# Patient Record
Sex: FEMALE | Race: WHITE | NOT HISPANIC OR LATINO
[De-identification: names, ages, dates, MRNs, and addresses within clinical notes are randomized per-mention and may not be internally consistent; named-entity substitution may affect disease eponyms.]

---

## 2017-11-28 ENCOUNTER — TRANSCRIPTION ENCOUNTER (OUTPATIENT)
Age: 67
End: 2017-11-28

## 2019-05-28 PROBLEM — Z00.00 ENCOUNTER FOR PREVENTIVE HEALTH EXAMINATION: Status: ACTIVE | Noted: 2019-05-28

## 2019-06-24 ENCOUNTER — APPOINTMENT (OUTPATIENT)
Dept: ORTHOPEDIC SURGERY | Facility: CLINIC | Age: 69
End: 2019-06-24
Payer: COMMERCIAL

## 2019-06-24 VITALS — HEIGHT: 62 IN | BODY MASS INDEX: 23 KG/M2 | WEIGHT: 125 LBS

## 2019-06-24 DIAGNOSIS — Z86.39 PERSONAL HISTORY OF OTHER ENDOCRINE, NUTRITIONAL AND METABOLIC DISEASE: ICD-10-CM

## 2019-06-24 DIAGNOSIS — Z86.69 PERSONAL HISTORY OF OTHER DISEASES OF THE NERVOUS SYSTEM AND SENSE ORGANS: ICD-10-CM

## 2019-06-24 DIAGNOSIS — Z85.3 PERSONAL HISTORY OF MALIGNANT NEOPLASM OF BREAST: ICD-10-CM

## 2019-06-24 DIAGNOSIS — Z78.9 OTHER SPECIFIED HEALTH STATUS: ICD-10-CM

## 2019-06-24 DIAGNOSIS — M25.549 PAIN IN JOINTS OF UNSPECIFIED HAND: ICD-10-CM

## 2019-06-24 PROCEDURE — 73030 X-RAY EXAM OF SHOULDER: CPT | Mod: LT

## 2019-06-24 PROCEDURE — 99204 OFFICE O/P NEW MOD 45 MIN: CPT

## 2019-06-24 RX ORDER — CETIRIZINE HCL 10 MG
TABLET ORAL
Refills: 0 | Status: ACTIVE | COMMUNITY

## 2019-06-24 RX ORDER — ESCITALOPRAM OXALATE 5 MG/1
TABLET, FILM COATED ORAL
Refills: 0 | Status: ACTIVE | COMMUNITY

## 2019-06-24 RX ORDER — ATORVASTATIN CALCIUM 80 MG/1
TABLET, FILM COATED ORAL
Refills: 0 | Status: ACTIVE | COMMUNITY

## 2019-06-24 NOTE — ASSESSMENT
[FreeTextEntry1] : 69-year-old woman about 6-7 months following her LEFT rotator cuff repair which was the second time she had this surgery in the LEFT shoulder. I explained to her that often healing and recovery he may take even up to a year after surgery in many patients will continue improving between 6 months and a year and therefore I think it's early to determine whether or not she will get better or not. I recommended going into a more formal physical therapy. She should do pain-free strengthening. Heat and ice and NSAIDs if needed. If it's not getting any better with the therapy in the next 5-6 weeks I would like for her to get a new MRI of the shoulder to evaluate the tendon for healing. I explained to her that these tendons do not always heal well because of poor vascularity.\par Followup in about 6 weeks

## 2019-06-24 NOTE — HISTORY OF PRESENT ILLNESS
[de-identified] : Ms. Browne is a 69-year-old left-hand-dominant woman who comes in with pain in her LEFT shoulder about 6-7 months following revision LEFT rotator cuff repair. She may also have had a biceps tenodesis. She does not have the operative report.She first had a LEFT rotator cuff repair in November 2014 and then had surgery again November 2018. Her shoulder had recovered in between the 2 surgeries. The surgeries were done by Dr. Gomez. She did physical therapy in January and February of this year for about 2 months. She was having ongoing pain and had a corticosteroid injection in April. She's still having pain in the lateral shoulder which can be 4/10 intermittently and dull. It's better when she takes medications such as meloxicam and worse with reaching and lifting particularly overhead.\par She also had a RIGHT rotator cuff repair surgery in 2016. She's had double mastectomy in 1997 and never got full elevation of her arms bilaterally after that surgery.

## 2019-06-24 NOTE — PHYSICAL EXAM
[UE] : 5/5 motor strength in bilateral upper extremities [Normal LUE] : Left Upper Extremity: No scars, rashes, lesions, ulcers, skin intact [Normal Touch] : sensation intact for touch [Normal RUE] : Right Upper Extremity: No scars, rashes, lesions, ulcers, skin intact [Normal] : Oriented to person, place, and time, insight and judgement were intact and the affect was normal [de-identified] : Bilateral shoulders\par Cervical spine is without tenderness and ROM is within normal limits and without pain.\par Normal appearance. \par AROM: 170 FE, IR to T 9, 180 abd.\par PROM: 170 FE, 70 ER at the side, 90 ER and 50 IR in the 90 degree abducted position. All about equal bilaterally\par Motor:  4/5  supraspinatus LEFT shoulder with pain,  5-/5 ER LEFT shoulder, 5/5 IR, 5/5 biceps, 5/5 deltoid.  Normal lift off test. 5/5 RIGHT shoulder throughout\par mild pain LEFT with Neer,  + Mackenzie. -  X-arm.   - [place]'s,  Speeds.\par Tender over the biceps tendon and the anterior shoulder.  \par Laxity: normal.\par No scapular winging.\par Sensation is intact distally in the UE.\par Skin is intact in the UE. \par Intact Motor distally.\par  [de-identified] : Well-healed shoulder incisions [de-identified] : No respiratory distress [de-identified] : \par X-rays LEFT shoulder 3 views today show slight elevation of the humeral head on the AP view. There are some osteopenic areas around the greater tuberosity possibly related to anchor placement. No narrowing glenohumeral joint. Possible distal clavicle resection

## 2019-08-05 ENCOUNTER — APPOINTMENT (OUTPATIENT)
Dept: ORTHOPEDIC SURGERY | Facility: CLINIC | Age: 69
End: 2019-08-05
Payer: COMMERCIAL

## 2019-08-05 DIAGNOSIS — M17.11 UNILATERAL PRIMARY OSTEOARTHRITIS, RIGHT KNEE: ICD-10-CM

## 2019-08-05 DIAGNOSIS — S80.01XA CONTUSION OF RIGHT KNEE, INITIAL ENCOUNTER: ICD-10-CM

## 2019-08-05 PROCEDURE — 73564 X-RAY EXAM KNEE 4 OR MORE: CPT | Mod: RT

## 2019-08-05 PROCEDURE — 99214 OFFICE O/P EST MOD 30 MIN: CPT
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
## 2019-08-05 NOTE — PHYSICAL EXAM
[Normal RLE] : Right Lower Extremity: No scars, rashes, lesions, ulcers, skin intact [Normal Touch] : sensation intact for touch [Normal LLE] : Left Lower Extremity: No scars, rashes, lesions, ulcers, skin intact [Normal] : No swelling, no edema, normal pedal pulses and normal temperature [LE] : Sensory: Intact in bilateral lower extremities [DP] : dorsalis pedis 2+ and symmetric bilaterally [PT] : posterior tibial 2+ and symmetric bilaterally [de-identified] : RIGHT  shoulder:\par Cervical spine is without tenderness and ROM is within normal limits and without pain.\par Normal appearance. \par AROM: 175 FE, IR to T 9, 180 abd.\par PROM: 175 FE, 60 ER at the side, 90 ER and 40 IR in the 90 degree abducted position.\par Motor:  5-/5  supraspinatus,  5/5 ER, 5/5 IR, 5/5 biceps, 5/5 deltoid.  Normal lift off test\par mild discomfort with Neer and Mackenzie. -  X-arm.   - [place]'s, + Speeds.\par no significant tenderness.  \par Laxity: normal.\par No scapular winging.\par Sensation is intact distally in the UE.\par Skin is intact in the UE. \par Intact Motor distally.\par \par RIGHT Knee:\par Nonantalgic gait. Minimal knee pain walking\par Small effusion.\par - erythema, edema, warmth. No ecchymoses\par Tender medial tibial plateau.\par ROM: 0 degrees extension to 120 degrees flexion RIGHT versus 130 LEFT. ++ Patellofemoral  crepitus RIGHT more than LEFT knee\par - Dariana.\par 1A Lachman.  - Pivot shift. - posterior drawer. Normal rotational, varus/valgus laxity.\par Intact extensor mechanism.\par NVI distally.\par No abrasions [de-identified] : \par Reviewed the MRI from July 22, 2019 which does show tearing certainly of the articular fibers. It looks like some of the bursal sided fibers may be intact and it may not be completely torn.Postsurgical changes are present.\par \par X-rays of the RIGHT knee weightbearing for the use today show no fractures. Osteophytes of the patella. Ossicle near the posterior tib-fib joint.
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
## 2019-08-06 ENCOUNTER — RX RENEWAL (OUTPATIENT)
Age: 69
End: 2019-08-06

## 2019-12-03 PROBLEM — M25.512 LEFT SHOULDER PAIN: Status: ACTIVE | Noted: 2019-06-24

## 2019-12-04 ENCOUNTER — APPOINTMENT (OUTPATIENT)
Dept: ORTHOPEDIC SURGERY | Facility: CLINIC | Age: 69
End: 2019-12-04
Payer: COMMERCIAL

## 2019-12-04 DIAGNOSIS — M25.512 PAIN IN LEFT SHOULDER: ICD-10-CM

## 2019-12-04 PROCEDURE — 99213 OFFICE O/P EST LOW 20 MIN: CPT | Mod: 25

## 2019-12-04 PROCEDURE — 20610 DRAIN/INJ JOINT/BURSA W/O US: CPT | Mod: LT

## 2019-12-04 NOTE — PHYSICAL EXAM
[de-identified] : LEFT  shoulder:\par Cervical spine is without tenderness and ROM is within normal limits and without pain.\par Normal appearance. There may be mild hypertrophy posterior shoulder LEFT compared to RIGHT\par AROM: 180 FE, IR to T 9 , 180 abd.\par PROM: 180 FE, 70 ER at the side, 90 ER and 65 IR in the 90 degree abducted position.\par Motor:  4+/5  supraspinatus,  5-/5 ER, 5/5 IR, 5/5 biceps, 5/5 deltoid.  Normal lift off test\par - Neer, - Mackenzie. + Pain Posterior shoulder with X-arm.   - [place]'s, - Speeds.\par Nontender a.c. joint. Nontender over the biceps tendon and the anterior shoulder.  \par Laxity: normal.\par No scapular winging.\par Sensation is intact distally in the UE.\par Skin is intact in the UE. \par Intact Motor distally.\par

## 2019-12-04 NOTE — HISTORY OF PRESENT ILLNESS
[de-identified] : Ms. Browne comes in for followup for her LEFT shoulder where she has had prior rotator cuff repair and revision repair that was performed about a year ago. She was having ongoing pain. MRI in July of this year showed a tear of the Articular fibers supraspinatus and infraspinatus tendons Retracted about 3 cm and the bursal fibers retracted about 1.4 cm in the supraspinatus. Looking at the films I do feel that there is some of the repair which is intact.\par She has done more physical therapy. Her shoulder is feeling worse. She has more lateral shoulder pain.\par Does do the home exercises. Pain is in the lateral shoulder with certain movements greatest when she reaches across her body. Often she doesn't have pain when she is just at rest but she will get a rather consistent pain with movements which is frustrating. Her shoulder is not stiff in the strength is okay with mild weakness.\par She is taking a muscle relaxer and meloxicam and gabapentin at night.

## 2019-12-04 NOTE — DISCUSSION/SUMMARY
[de-identified] : 69-year-old woman who has had prior rotator cuff repair and  a revision 1 yr ago with ongoing shoulder pain.\par She has done extensive therapy. She has good motion and strength is okay but there is still mild weakness. Based on the MRI it looks like the humeral head is riding up and there is an undersurface or delaminated tear in the rotator cuff but it looks like some of the superior fibers are attached just lateral to the articular surface.\par Since she hasn't improved we decided to try a corticosteroid injection to see if this alleviates the pain. Hopefully allows her to rehabilitate better. She may try acupuncture. She will take a break from the therapy But continue with exercises on her own.\par If it's not getting better she'll get a diagnostic ultrasound.\par Followup 8 wks

## 2019-12-04 NOTE — PROCEDURE
[Injection] : Injection [Left] : of the left [Subacromial Bursa] : subacromial bursa [Inflammation] : inflammation [Bleeding] : bleeding [Infection] : infection [Patient] : patient [Allergic Reaction] : allergic reaction [Risk] : risk [Verbal Consent Obtained] : verbal consent was obtained prior to the procedure [Alternatives] : alternatives [Benefits] : benefits [Betadine] : betadine [Alcohol] : alcohol [Posterior] : posterior [1% Lidocaine___(mL)] : [unfilled] mL of 1% Lidocaine [22] : a 22-gauge [Bandage Applied] : a bandage [Tolerated Well] : The patient tolerated the procedure well [Methylpred. 40mg/mL___(mL)] : [unfilled] mL 40mg/mL methylprednisolone [None] : none [No Strenuous Activity___day(s)] : avoid strenuous activity for [unfilled] day(s) [PRN] : PRN

## 2020-05-27 ENCOUNTER — APPOINTMENT (OUTPATIENT)
Dept: ORTHOPEDIC SURGERY | Facility: CLINIC | Age: 70
End: 2020-05-27
Payer: COMMERCIAL

## 2020-05-27 VITALS — BODY MASS INDEX: 23 KG/M2 | WEIGHT: 125 LBS | HEIGHT: 62 IN

## 2020-05-27 PROCEDURE — 99214 OFFICE O/P EST MOD 30 MIN: CPT

## 2020-05-27 PROCEDURE — 73030 X-RAY EXAM OF SHOULDER: CPT | Mod: LT

## 2020-05-27 NOTE — ASSESSMENT
[FreeTextEntry1] : 70-year-old woman who had revision LEFT shoulder Rotator cuff repair one and a half years ago. The first rotator cuff repair was in November 2014.\par She has at least partial tearing or significant attenuation of the supraspinatus repair and has chronic weakness although she can Elevate her arms well and has very good motion. There is no evidence of arthritis developing at this time.\par I suggested she go back to physical therapy to see if this helps the pain and strength for the time being. At some point surgery may need to be considered which would be either a revision repair depending on the quality of the tissue possibly with allograft versus a superior capsular reconstruction.\par she may try some acupuncture. I suggested taking either meloxicam or Aleve but shouldn't take both medications. She can take 1-2 Aleve up to twice a day with meals but watch her stomach. Otherwise take Tylenol. Heat and ice as needed.\par His pain is persisting we may get another ultrasound to check the Tendon for tearing or at some point to consider surgical treatment. The MRI and ultrasounds have been difficult to interpret given the surgical changes and wasn't clear if there was a complete tear or partial tear or just attenuation.different studies gait different impressions.\par She has spent another corticosteroid injection but I would try to avoid more corticosteroids which is weak in the rotator cuff and may make future surgery less successful.Follow up in 6-8 weeks.

## 2020-05-27 NOTE — PHYSICAL EXAM
[UE] : Sensory: Intact in bilateral upper extremities [Normal RUE] : Right Upper Extremity: No scars, rashes, lesions, ulcers, skin intact [Normal Touch] : sensation intact for touch [Normal LUE] : Left Upper Extremity: No scars, rashes, lesions, ulcers, skin intact [Normal] : Oriented to person, place, and time, insight and judgement were intact and the affect was normal [de-identified] : LEFT  shoulder/ rate for comparison\par Cervical spine is without tenderness and ROM is within normal limits and without pain.\par Normal appearance. \par AROM: 175 FE, IR to T 9 , 180 abd. Equal bilateral but pain with extremes of motion on the LEFT but not the RIGHT\par PROM: 175 FE, 70 ER at the side, 90 ER and 65 IR in the 90 degree abducted position. Pain LEFT shoulder at extremes of motion but not the RIGHT\par Motor:  4-/5  supraspinatus LEFT versus 5/5 RIGHT,  5-/5 ER, 5/5 IR, 5/5 biceps, 5/5 deltoid.  Normal lift off test\par + Neer, + Mackenzie. + Pain Posterior shoulder with X-arm.   - [place]'s, + Speeds  lEFT.\par Nontender a.c. joint. Nontender over the biceps tendon and the anterior shoulder.  \par Laxity: normal.\par No scapular winging.\par Sensation is intact distally in the UE.\par Skin is intact in the UE. \par Intact Motor distally.\par  [de-identified] : No respiratory distress or coughing [de-identified] : \par X-rays LEFT shoulder 3 views taken today are without any change compared to x-rays almost one year ago in June 2019. There appears to be mild elevation of the humeral head but no other degenerative changes are seen.\par There are surgical clips in the axilla consistent with her prior breast cancer surgery with axillary dissection.

## 2020-05-27 NOTE — HISTORY OF PRESENT ILLNESS
[de-identified] : Ms. Browne comes in for followup for her LEFT shoulder where she has had prior rotator cuff repair and revision repair that was performed about 1 1/2 yrs ago by Dr. Gomez. She had a steroid injection in Dec 2019 which helped a lot and she could do everything without pain.\par \par Because of Covid she has been cleaning and cooking more for herself in the past several months and she thinks this aggravated her shoulder. \par Pain is variable from 0-7/10 mostly in the lateral shoulder. It hurts at night and sometimes in the morning or with movements.\par She takes meloxicam usually at night but she doesn't know the dose. Sometimes she'll take an Aleve in the day as well.\par She does her shoulder exercises regularly.

## 2020-07-14 PROBLEM — M75.102 ROTATOR CUFF TEAR, NON-TRAUMATIC, LEFT: Status: ACTIVE | Noted: 2019-06-24

## 2020-07-14 PROBLEM — M75.42 SHOULDER IMPINGEMENT, LEFT: Status: ACTIVE | Noted: 2019-06-24

## 2020-07-14 PROBLEM — Z98.890 S/P ROTATOR CUFF REPAIR: Status: ACTIVE | Noted: 2019-06-24

## 2020-07-15 ENCOUNTER — APPOINTMENT (OUTPATIENT)
Dept: ORTHOPEDIC SURGERY | Facility: CLINIC | Age: 70
End: 2020-07-15
Payer: MEDICARE

## 2020-07-15 DIAGNOSIS — Z98.890 OTHER SPECIFIED POSTPROCEDURAL STATES: ICD-10-CM

## 2020-07-15 DIAGNOSIS — M75.102 UNSPECIFIED ROTATOR CUFF TEAR OR RUPTURE OF LEFT SHOULDER, NOT SPECIFIED AS TRAUMATIC: ICD-10-CM

## 2020-07-15 DIAGNOSIS — M75.42 IMPINGEMENT SYNDROME OF LEFT SHOULDER: ICD-10-CM

## 2020-07-15 DIAGNOSIS — M77.02 MEDIAL EPICONDYLITIS, LEFT ELBOW: ICD-10-CM

## 2020-07-15 PROCEDURE — 99214 OFFICE O/P EST MOD 30 MIN: CPT

## 2020-07-15 NOTE — PHYSICAL EXAM
[UE] : Sensory: Intact in bilateral upper extremities [Normal RUE] : Right Upper Extremity: No scars, rashes, lesions, ulcers, skin intact [Normal Touch] : sensation intact for touch [Normal LUE] : Left Upper Extremity: No scars, rashes, lesions, ulcers, skin intact [Normal] : Alert and in no acute distress [Rad] : radial 2+ and symmetric bilaterally [de-identified] : No respiratory distress or coughing [de-identified] : LEFT  shoulder/ rate for comparison\par Cervical spine is without tenderness and ROM is within normal limits and without pain.\par Normal appearance. \par AROM: 175 FE, IR to T 9 , 180 abd. no significant pain\par PROM: 175 FE, 70 ER at the side, 90 ER and 65 IR in the 90 degree abducted position\par Motor:  5-/5  supraspinatus LEFT versus 5/5 RIGHT,  5/5 ER, 5/5 IR, 5/5 biceps, 5/5 deltoid.  Normal lift off test\par Slight discomfort with Neer, Mackenzie.\par Nontender a.c. joint. Nontender over the biceps tendon and the anterior shoulder.  \par Laxity: normal.\par No scapular winging.\par Sensation is intact distally in the UE.\par Skin is intact in the UE. \par Intact Motor distally.\par \par LEFT elbow\par Mildly tender medial epicondyles. Negative Tinel's cubital tunnel. Nontender lateral epicondyles.\par Full elbow range of motion 0-150° and full pronation and supination without pain.\par Motor and sensation are intact distally

## 2020-07-15 NOTE — ASSESSMENT
[FreeTextEntry1] : 70-year-old woman who had revision LEFT shoulder Rotator cuff repair 2018 and primary rotator cuff repair in November 2014.\par She has at least partial tearing or significant attenuation of the supraspinatus repair. She had been having ongoing pain but currently is doing quite well with good motion and good strength. Pain is much better than it had been Given the improvement She she will finish up the physical therapy and do the exercises on her own. He can and ice as needed. Aleve as needed. She also seems to be getting some medial epicondylitis. She should watch her  when she is playing golf. Stretch the forearm. Heat and ice. Full-time shell. If it's getting more symptomatic she should come in for further treatment and may consider physical therapy for this as well.

## 2020-07-15 NOTE — HISTORY OF PRESENT ILLNESS
[de-identified] : Ms. Browne comes in for followup for her LEFT shoulder where she has had prior rotator cuff repair and revision repair that was performed about 1 1/2 yrs ago by Dr. Gomez. She had a steroid injection in Dec 2019 which helped a lot. .\par She has been going for PT since I last saw her.\par Her pain is much better. It had gotten aggravated during corona virus because she was doing a lot of housecleaning but since she stopped doing that it improved. She has good motion and good strength. She has a few sessions LEFT and physical therapy\par Sometimes she'll take an Aleve  QOD but not for the shoulder. Occasionally she takes a muscle relaxant. She ices. She is playing golf. \par She does her shoulder exercises regularly. She feels like PT helped. \par She has some LEFT medial elbow pain recently. It's very mild.

## 2020-07-31 ENCOUNTER — RESULT REVIEW (OUTPATIENT)
Age: 70
End: 2020-07-31

## 2020-11-26 ENCOUNTER — EMERGENCY (EMERGENCY)
Facility: HOSPITAL | Age: 70
LOS: 1 days | Discharge: HOME | End: 2020-11-26
Attending: EMERGENCY MEDICINE | Admitting: EMERGENCY MEDICINE
Payer: MEDICARE

## 2020-11-26 VITALS
TEMPERATURE: 98 F | HEART RATE: 77 BPM | OXYGEN SATURATION: 95 % | HEIGHT: 62 IN | DIASTOLIC BLOOD PRESSURE: 78 MMHG | SYSTOLIC BLOOD PRESSURE: 121 MMHG | WEIGHT: 115.08 LBS | RESPIRATION RATE: 16 BRPM

## 2020-11-26 PROCEDURE — 12001 RPR S/N/AX/GEN/TRNK 2.5CM/<: CPT

## 2020-11-26 PROCEDURE — 99283 EMERGENCY DEPT VISIT LOW MDM: CPT | Mod: 25

## 2020-11-26 PROCEDURE — 99282 EMERGENCY DEPT VISIT SF MDM: CPT | Mod: 25

## 2020-11-26 NOTE — ED PROVIDER NOTE - PHYSICAL EXAMINATION
CONSTITUTIONAL:  Generally well appearing, no acute distress, alert, awake and oriented  HEENT:  Moist mucous membranes, normal voice  PULM:  No accessory muscle use, speaking full sentences  SKIN:  2.5 cm linear oblique laceration to ventral surface of right middle phalanx; hemostasis achieved.  NEURO:  Sensation intact throughout affected right hand, normal flexion/extension of fingers.  Cap refill <3 seconds throughout right hand.

## 2020-11-26 NOTE — ED PROVIDER NOTE - NS ED ROS FT
CONSTITUTIONAL:  No fever or chills, no bodyaches  HEENT:  No sore throat or headache, no nasal congestion  PULM:  No cough or shortness of breath  GI:  No diarrhea, no vomiting   SKIN:  + Laceration to right middle finger.  NEURO:  No numbness/tingling, normal movement to affected right hand/finger.

## 2020-11-26 NOTE — ED PROVIDER NOTE - CLINICAL SUMMARY MEDICAL DECISION MAKING FREE TEXT BOX
Patient in ED w concern for laceration of right middle finger, sustained from piece of broken glass.  Wound explored under bloodless field, no sign of FB.  Patient notes cut was sustained from single large piece of glass, no concern for FB.  Neurovasc status intact to affected right hand.  Tetanus UTD.  Wound is cleansed, irrigated, explored.  She is educated re wound care and follow up in apx 7 days for suture removal.  Patient is advised to follow up with their PCP in 1-2 days without fail for wound check.  Patient instructed to return to ED immediately should their symptoms worsen or if there is any concern prior to the recommended PCP follow up.  Patient is aware of plan and verbalizes their understanding.  Will discharge home at this time.

## 2020-11-26 NOTE — ED PROVIDER NOTE - OBJECTIVE STATEMENT
70 year old female presents to ED with concern for laceration to right middle finger sustained by broken piece of glass today while cleaning up.  Patient states she had a glass bowl that broke in half, denies multiple pieces of glass or concern for foreign body.  She presents to ED with oblique linear laceration to right middle finger along ventral surface, bandage in place.  Neurovascular status is intact to affected right upper extremity on arrival to ED.  Patient denies use of blood thinning medications.  Tetanus is up to date.

## 2020-11-26 NOTE — ED PROVIDER NOTE - NSFOLLOWUPINSTRUCTIONS_ED_ALL_ED_FT
Please follow up with your primary physician in 7 days for days for re evaluation and wound check.  Please return to ER immediately should your symptoms worsen or if you have any concern prior to this recommended follow up.          Finger Laceration    WHAT YOU NEED TO KNOW:    A finger laceration is a deep cut in your skin. Your blood vessels, bones, joints, tendons, or nerves may also be injured.    DISCHARGE INSTRUCTIONS:    Return to the emergency department if:   •Your wound comes apart.      •Blood soaks through your bandage.      •You have severe pain in your finger or hand.      •Your finger is pale and cold.      •You have sudden trouble moving your finger.      •Your swelling suddenly gets worse.      •You have red streaks on your skin coming from your wound.      Call your doctor or hand specialist if:   •You have new numbness or tingling.      •Your finger feels warm, looks swollen or red, and is draining pus.      •You have a fever.      •You have questions or concerns about your condition or care.      Medicines: You may need any of the following:   •Antibiotics help prevent a bacterial infection.       •Acetaminophen decreases pain and fever. It is available without a doctor's order. Ask how much to take and how often to take it. Follow directions. Read the labels of all other medicines you are using to see if they also contain acetaminophen, or ask your doctor or pharmacist. Acetaminophen can cause liver damage if not taken correctly. Do not use more than 4 grams (4,000 milligrams) total of acetaminophen in one day.       •Prescription pain medicine may be given. Ask your healthcare provider how to take this medicine safely. Some prescription pain medicines contain acetaminophen. Do not take other medicines that contain acetaminophen without talking to your healthcare provider. Too much acetaminophen may cause liver damage. Prescription pain medicine may cause constipation. Ask your healthcare provider how to prevent or treat constipation.       •Take your medicine as directed. Contact your healthcare provider if you think your medicine is not helping or if you have side effects. Tell him or her if you are allergic to any medicine. Keep a list of the medicines, vitamins, and herbs you take. Include the amounts, and when and why you take them. Bring the list or the pill bottles to follow-up visits. Carry your medicine list with you in case of an emergency.      Self-care:   •Apply ice on your finger for 15 to 20 minutes every hour or as directed. Use an ice pack, or put crushed ice in a plastic bag. Cover it with a towel before you apply it to your skin. Ice helps prevent tissue damage and decreases swelling and pain.      •Elevate your hand above the level of your heart as often as you can. This will help decrease swelling and pain. Prop your hand on pillows or blankets to keep it elevated comfortably.      •Wear your splint as directed. A splint will decrease movement and stress on your wound. The splint may help your wound heal faster. Ask your healthcare provider how to apply and remove a splint.      •Apply ointments to decrease scarring. Do not apply ointments until your healthcare provider says it is okay. You may need to wait until your wound is healed. Ask which ointment to buy and how often to use it.      Wound care:   •Do not get your wound wet until your healthcare provider says it is okay. Do not soak your hand in water. Do not go swimming until your healthcare provider says it is okay. When your healthcare provider says it is okay, carefully wash around the wound with soap and water. Let soap and water run over your wound. Gently pat the area dry or allow it to air dry.      •Change your bandages when they get wet, dirty, or after washing. Apply new, clean bandages as directed. Do not apply elastic bandages or tape too tightly. Do not put powders or lotions on your wound.      •Apply antibiotic ointment as directed. Your healthcare provider may give you antibiotic ointment to put over your wound if you have stitches. If you have Strips-Strips™ over your wound, let them dry up and fall off on their own. If they do not fall off within 14 days, gently remove them. If you have glue over your wound, do not remove or pick at it. If your glue comes off, do not replace it with glue that you have at home.      •Check your wound every day for signs of infection. Signs of infection include swelling, redness, or pus.      Follow up with your doctor or hand specialist in 2 days: Write down your questions so you remember to ask them during your visits.       © Copyright [employer] 2020           back to top                          © Copyright [employer] 2020

## 2020-11-26 NOTE — ED PROVIDER NOTE - PATIENT PORTAL LINK FT
You can access the FollowMyHealth Patient Portal offered by [facility] by registering at the following website: [email or web address]. By joining [facility] FollowMyHealth portal, you will also be able to view your health information using other applications (apps) compatible with our system.

## 2020-11-30 DIAGNOSIS — S61.212A LACERATION WITHOUT FOREIGN BODY OF RIGHT MIDDLE FINGER WITHOUT DAMAGE TO NAIL, INITIAL ENCOUNTER: ICD-10-CM
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
## 2020-11-30 DIAGNOSIS — Z88.8 ALLERGY STATUS TO OTHER DRUGS, MEDICAMENTS AND BIOLOGICAL SUBSTANCES: ICD-10-CM
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
## 2023-02-28 NOTE — REASON FOR VISIT
[Follow-Up Visit] : a follow-up visit for [Shoulder Pain] : shoulder pain Abbe Flap (Lower To Upper Lip) Text: The defect of the upper lip was assessed and measured.  Given the location and size of the defect, an Abbe flap was deemed most appropriate.  Using a sterile surgical marker, an appropriate Abbe flap was measured and drawn on the lower lip. Local anesthesia was then infiltrated. A scalpel was then used to incise the upper lip through and through the skin, vermilion, muscle and mucosa, leaving the flap pedicled on the opposite side.  The flap was then rotated and transferred to the lower lip defect.  The flap was then sutured into place with a three layer technique, closing the orbicularis oris muscle layer with subcutaneous buried sutures, followed by a mucosal layer and an epidermal layer.

## 2023-05-09 ENCOUNTER — APPOINTMENT (OUTPATIENT)
Dept: OBGYN | Facility: CLINIC | Age: 73
End: 2023-05-09
Payer: MEDICARE

## 2023-05-09 VITALS
DIASTOLIC BLOOD PRESSURE: 78 MMHG | WEIGHT: 112 LBS | OXYGEN SATURATION: 98 % | HEART RATE: 70 BPM | SYSTOLIC BLOOD PRESSURE: 133 MMHG | BODY MASS INDEX: 20.49 KG/M2

## 2023-05-09 DIAGNOSIS — N95.1 MENOPAUSAL AND FEMALE CLIMACTERIC STATES: ICD-10-CM

## 2023-05-09 DIAGNOSIS — R39.9 UNSPECIFIED SYMPTOMS AND SIGNS INVOLVING THE GENITOURINARY SYSTEM: ICD-10-CM

## 2023-05-09 DIAGNOSIS — N94.818 OTHER VULVODYNIA: ICD-10-CM

## 2023-05-09 PROCEDURE — 99203 OFFICE O/P NEW LOW 30 MIN: CPT

## 2023-05-09 RX ORDER — CIPROFLOXACIN HYDROCHLORIDE 250 MG/1
250 TABLET, FILM COATED ORAL
Qty: 6 | Refills: 1 | Status: ACTIVE | COMMUNITY
Start: 2023-05-09 | End: 1900-01-01

## 2023-05-12 DIAGNOSIS — B96.89 ACUTE VAGINITIS: ICD-10-CM

## 2023-05-12 DIAGNOSIS — N76.0 ACUTE VAGINITIS: ICD-10-CM

## 2023-05-12 LAB
BACTERIA UR CULT: NORMAL
CANDIDA VAG CYTO: NOT DETECTED
G VAGINALIS+PREV SP MTYP VAG QL MICRO: DETECTED
T VAGINALIS VAG QL WET PREP: NOT DETECTED

## 2023-05-12 RX ORDER — METRONIDAZOLE 7.5 MG/G
0.75 GEL VAGINAL
Qty: 1 | Refills: 4 | Status: ACTIVE | COMMUNITY
Start: 2023-05-12 | End: 1900-01-01

## 2023-05-12 NOTE — HISTORY OF PRESENT ILLNESS
[Previously active] : previously active [Men] : men [Yes] : pregnancy [Patient reported PAP Smear was normal] : Patient reported PAP Smear was normal [Patient reported colonoscopy was normal] : Patient reported colonoscopy was normal [FreeTextEntry1] : Gertrude Browne presents for UTI. She states she has frequent burning and itch when she goes to the bathroom, no discharge. [TextBox_19] : s/p bilateral mastectomy [PapSmeardate] : 10/2022 [TextBox_43] : *thinks due this yr [TextBox_38] : dana, 1 miscarriage [FreeTextEntry2] :  has issues

## 2023-05-12 NOTE — PHYSICAL EXAM
[Appropriately responsive] : appropriately responsive [No Lymphadenopathy] : no lymphadenopathy [Regular Rate Rhythm] : regular rate rhythm [Soft] : soft [Non-tender] : non-tender [Non-distended] : non-distended [No Mass] : no mass [Oriented x3] : oriented x3 [Labia Majora] : normal [Labia Minora] : normal [Atrophy] : atrophy [Normal] : normal

## 2023-05-12 NOTE — PLAN
[FreeTextEntry1] : new pt problem visit:\par uti symptoms\par last had 6 months\par *burning with voiding\par vaginitis swab also sent\par will reach out with results\par \par *having shoulder surgery soon at [facility]

## 2023-05-23 ENCOUNTER — APPOINTMENT (OUTPATIENT)
Dept: OBGYN | Facility: CLINIC | Age: 73
End: 2023-05-23

## 2023-05-26 ENCOUNTER — APPOINTMENT (OUTPATIENT)
Dept: OBGYN | Facility: CLINIC | Age: 73
End: 2023-05-26
Payer: MEDICARE

## 2023-05-26 VITALS
DIASTOLIC BLOOD PRESSURE: 78 MMHG | BODY MASS INDEX: 20.43 KG/M2 | SYSTOLIC BLOOD PRESSURE: 133 MMHG | HEIGHT: 62 IN | WEIGHT: 111 LBS | OXYGEN SATURATION: 99 % | HEART RATE: 65 BPM

## 2023-05-26 DIAGNOSIS — N76.0 ACUTE VAGINITIS: ICD-10-CM

## 2023-05-26 DIAGNOSIS — B96.89 ACUTE VAGINITIS: ICD-10-CM

## 2023-05-26 DIAGNOSIS — N76.2 ACUTE VULVITIS: ICD-10-CM

## 2023-05-26 PROCEDURE — 99213 OFFICE O/P EST LOW 20 MIN: CPT

## 2023-05-26 RX ORDER — NYSTATIN AND TRIAMCINOLONE ACETONIDE 100000; 1 MG/G; MG/G
100000-0.1 CREAM TOPICAL TWICE DAILY
Qty: 1 | Refills: 3 | Status: ACTIVE | COMMUNITY
Start: 2023-05-26 | End: 1900-01-01

## 2023-05-26 RX ORDER — ESTRADIOL 10 UG/1
10 TABLET VAGINAL
Qty: 25 | Refills: 3 | Status: ACTIVE | COMMUNITY
Start: 2023-05-09 | End: 1900-01-01

## 2023-05-26 RX ORDER — METRONIDAZOLE 500 MG/1
500 TABLET ORAL TWICE DAILY
Qty: 14 | Refills: 3 | Status: ACTIVE | COMMUNITY
Start: 2023-05-26 | End: 1900-01-01

## 2023-05-26 RX ORDER — ESTRADIOL 10 UG/1
10 TABLET VAGINAL
Qty: 21 | Refills: 9 | Status: ACTIVE | COMMUNITY
Start: 2023-05-26 | End: 1900-01-01

## 2023-05-31 NOTE — PLAN
[FreeTextEntry1] : f/u: dx'ed with bv, took metrogel but still feels irritated\par \par d/w pt I think a large component of sx is atrophy of menopause. \par \par encourage vaginal estrogen, r/b/a vagifem rx\par \par also may try mycolog II for external use

## 2023-05-31 NOTE — HISTORY OF PRESENT ILLNESS
[Patient reported PAP Smear was normal] : Patient reported PAP Smear was normal [FreeTextEntry1] : 72 yo patient presents for follow up of vaginal itching. Patient states she tried metronidazole gel for 1 week and she does not see or feel any improvement.  [PapSmeardate] : 10/2022

## 2023-05-31 NOTE — PHYSICAL EXAM
[Appropriately responsive] : appropriately responsive [Alert] : alert [Soft] : soft [Non-tender] : non-tender [Non-distended] : non-distended [No Mass] : no mass [Oriented x3] : oriented x3 [Labia Majora] : normal [Labia Minora] : normal [Normal] : normal [Atrophy] : atrophy

## 2023-07-12 NOTE — ED ADULT NURSE NOTE - NSFALLRSKINDICATORS_ED_ALL_ED
no

## 2024-10-23 ENCOUNTER — APPOINTMENT (RX ONLY)
Dept: URBAN - METROPOLITAN AREA CLINIC 95 | Facility: CLINIC | Age: 74
Setting detail: DERMATOLOGY
End: 2024-10-23

## 2024-10-23 DIAGNOSIS — D22 MELANOCYTIC NEVI: ICD-10-CM

## 2024-10-23 DIAGNOSIS — L81.4 OTHER MELANIN HYPERPIGMENTATION: ICD-10-CM

## 2024-10-23 DIAGNOSIS — L82.1 OTHER SEBORRHEIC KERATOSIS: ICD-10-CM

## 2024-10-23 DIAGNOSIS — D18.0 HEMANGIOMA: ICD-10-CM

## 2024-10-23 DIAGNOSIS — L57.0 ACTINIC KERATOSIS: ICD-10-CM

## 2024-10-23 DIAGNOSIS — Z85.828 PERSONAL HISTORY OF OTHER MALIGNANT NEOPLASM OF SKIN: ICD-10-CM

## 2024-10-23 PROBLEM — D22.72 MELANOCYTIC NEVI OF LEFT LOWER LIMB, INCLUDING HIP: Status: ACTIVE | Noted: 2024-10-23

## 2024-10-23 PROBLEM — D22.62 MELANOCYTIC NEVI OF LEFT UPPER LIMB, INCLUDING SHOULDER: Status: ACTIVE | Noted: 2024-10-23

## 2024-10-23 PROBLEM — D22.5 MELANOCYTIC NEVI OF TRUNK: Status: ACTIVE | Noted: 2024-10-23

## 2024-10-23 PROBLEM — D22.61 MELANOCYTIC NEVI OF RIGHT UPPER LIMB, INCLUDING SHOULDER: Status: ACTIVE | Noted: 2024-10-23

## 2024-10-23 PROBLEM — D18.01 HEMANGIOMA OF SKIN AND SUBCUTANEOUS TISSUE: Status: ACTIVE | Noted: 2024-10-23

## 2024-10-23 PROBLEM — D22.71 MELANOCYTIC NEVI OF RIGHT LOWER LIMB, INCLUDING HIP: Status: ACTIVE | Noted: 2024-10-23

## 2024-10-23 PROCEDURE — 99203 OFFICE O/P NEW LOW 30 MIN: CPT | Mod: 25

## 2024-10-23 PROCEDURE — ? LIQUID NITROGEN

## 2024-10-23 PROCEDURE — 17000 DESTRUCT PREMALG LESION: CPT

## 2024-10-23 PROCEDURE — ? COUNSELING

## 2024-10-23 PROCEDURE — 17003 DESTRUCT PREMALG LES 2-14: CPT

## 2024-10-23 ASSESSMENT — LOCATION DETAILED DESCRIPTION DERM
LOCATION DETAILED: LEFT ANTERIOR PROXIMAL THIGH
LOCATION DETAILED: LEFT ANTERIOR PROXIMAL UPPER ARM
LOCATION DETAILED: NASAL SUPRATIP
LOCATION DETAILED: LEFT DISTAL PRETIBIAL REGION
LOCATION DETAILED: LEFT ANTERIOR DISTAL THIGH
LOCATION DETAILED: LEFT PROXIMAL DORSAL FOREARM
LOCATION DETAILED: RIGHT ANTERIOR DISTAL UPPER ARM
LOCATION DETAILED: RIGHT ANTERIOR DISTAL THIGH
LOCATION DETAILED: RIGHT ANTERIOR PROXIMAL THIGH
LOCATION DETAILED: INFERIOR THORACIC SPINE
LOCATION DETAILED: LEFT POSTERIOR SHOULDER
LOCATION DETAILED: LEFT PROXIMAL PRETIBIAL REGION
LOCATION DETAILED: LEFT DORSAL WRIST
LOCATION DETAILED: EPIGASTRIC SKIN
LOCATION DETAILED: LEFT PROXIMAL POSTERIOR UPPER ARM
LOCATION DETAILED: RIGHT MEDIAL DISTAL PRETIBIAL REGION
LOCATION DETAILED: RIGHT POSTERIOR SHOULDER
LOCATION DETAILED: RIGHT ANTERIOR PROXIMAL UPPER ARM
LOCATION DETAILED: RIGHT PROXIMAL POSTERIOR UPPER ARM
LOCATION DETAILED: NASAL TIP
LOCATION DETAILED: LEFT DISTAL DORSAL FOREARM
LOCATION DETAILED: PERIUMBILICAL SKIN
LOCATION DETAILED: RIGHT DISTAL DORSAL FOREARM
LOCATION DETAILED: RIGHT LATERAL SUPERIOR CHEST

## 2024-10-23 ASSESSMENT — LOCATION SIMPLE DESCRIPTION DERM
LOCATION SIMPLE: RIGHT PRETIBIAL REGION
LOCATION SIMPLE: RIGHT FOREARM
LOCATION SIMPLE: LEFT SHOULDER
LOCATION SIMPLE: RIGHT THIGH
LOCATION SIMPLE: RIGHT SHOULDER
LOCATION SIMPLE: RIGHT UPPER ARM
LOCATION SIMPLE: UPPER BACK
LOCATION SIMPLE: ABDOMEN
LOCATION SIMPLE: CHEST
LOCATION SIMPLE: LEFT POSTERIOR UPPER ARM
LOCATION SIMPLE: RIGHT POSTERIOR UPPER ARM
LOCATION SIMPLE: LEFT PRETIBIAL REGION
LOCATION SIMPLE: LEFT FOREARM
LOCATION SIMPLE: LEFT UPPER ARM
LOCATION SIMPLE: NOSE
LOCATION SIMPLE: LEFT THIGH
LOCATION SIMPLE: LEFT WRIST

## 2024-10-23 ASSESSMENT — LOCATION ZONE DERM
LOCATION ZONE: ARM
LOCATION ZONE: LEG
LOCATION ZONE: NOSE
LOCATION ZONE: TRUNK

## 2024-10-23 NOTE — PROCEDURE: LIQUID NITROGEN
Show Aperture Variable?: Yes
Consent: The patient's consent was obtained including but not limited to risks of crusting, scabbing, blistering, scarring, darker or lighter pigmentary change, recurrence, incomplete removal and infection.
Duration Of Freeze Thaw-Cycle (Seconds): 5
Number Of Freeze-Thaw Cycles: 2 freeze-thaw cycles
Render Note In Bullet Format When Appropriate: No
Post-Care Instructions: I reviewed with the patient in detail post-care instructions. Patient is to wear sunprotection, and avoid picking at any of the treated lesions. Pt may apply Vaseline to crusted or scabbing areas.
Detail Level: Detailed